# Patient Record
Sex: MALE | Race: WHITE | NOT HISPANIC OR LATINO | Employment: FULL TIME | RURAL
[De-identification: names, ages, dates, MRNs, and addresses within clinical notes are randomized per-mention and may not be internally consistent; named-entity substitution may affect disease eponyms.]

---

## 2019-03-27 ENCOUNTER — OFFICE VISIT (OUTPATIENT)
Dept: CARDIAC SURGERY | Facility: CLINIC | Age: 59
End: 2019-03-27

## 2019-03-27 VITALS
SYSTOLIC BLOOD PRESSURE: 145 MMHG | HEART RATE: 74 BPM | WEIGHT: 173 LBS | HEIGHT: 68 IN | DIASTOLIC BLOOD PRESSURE: 84 MMHG | BODY MASS INDEX: 26.22 KG/M2

## 2019-03-27 DIAGNOSIS — I73.9 PVD (PERIPHERAL VASCULAR DISEASE) (HCC): Primary | ICD-10-CM

## 2019-03-27 PROCEDURE — 99406 BEHAV CHNG SMOKING 3-10 MIN: CPT | Performed by: THORACIC SURGERY (CARDIOTHORACIC VASCULAR SURGERY)

## 2019-03-27 PROCEDURE — 99204 OFFICE O/P NEW MOD 45 MIN: CPT | Performed by: THORACIC SURGERY (CARDIOTHORACIC VASCULAR SURGERY)

## 2019-03-27 RX ORDER — LANOLIN ALCOHOL/MO/W.PET/CERES
1000 CREAM (GRAM) TOPICAL DAILY
COMMUNITY

## 2019-03-27 RX ORDER — ASPIRIN 81 MG/1
81 TABLET ORAL DAILY
COMMUNITY

## 2019-03-27 RX ORDER — OMEGA-3 FATTY ACIDS/FISH OIL 300-1000MG
1000 CAPSULE ORAL DAILY
COMMUNITY

## 2019-03-27 RX ORDER — CILOSTAZOL 100 MG/1
100 TABLET ORAL 2 TIMES DAILY
COMMUNITY

## 2019-03-27 RX ORDER — MELATONIN
1000 DAILY
COMMUNITY

## 2019-03-27 NOTE — PROGRESS NOTES
03/27/2019  Patient Information  Sameer TORRES  [facility] [address]   1960  'PCP/Referring Physician'  Cesar Shay MD  [phone]  Cesar Shay MD  [phone]  Chief Complaint   Patient presents with   • Consult     Np referred for PVD, complains of left leg pain.   • Peripheral Vascular Disease   • Leg Pain       History of Present Illness:   The patient is a 58-year-old male who is referred at this time for vascular disease.  He has been having leg pain for several years.  It appears to have progressed in nature however.  He is a smoker and has smoked approximately a pack per day for most of his adult life.  He continues to smoke.  He denies any specific trauma to this leg.  He has been referred for evaluation of his PV arterial study which is very abnormal with evidence of significant vascular disease in his left leg.  His ankle-brachial index of his left leg is 0.57.  He has normal ankle-brachial index on his right of 1.04.      Patient Active Problem List   Diagnosis   • PVD (peripheral vascular disease) (CMS/HCC)     Past Medical History:   Diagnosis Date   • Coronary artery disease    • Kidney stone    • Peripheral artery disease (CMS/HCC)      Past Surgical History:   Procedure Laterality Date   • CORONARY ARTERY BYPASS GRAFT         Current Outpatient Medications:   •  aspirin 81 MG EC tablet, Take 81 mg by mouth Daily., Disp: , Rfl:   •  cholecalciferol (VITAMIN D3) 1000 units tablet, Take 1,000 Units by mouth Daily., Disp: , Rfl:   •  cilostazol (PLETAL) 100 MG tablet, Take 100 mg by mouth 2 (Two) Times a Day., Disp: , Rfl:   •  Omega 3 1000 MG capsule, Take 1,000 mg by mouth Daily., Disp: , Rfl:   •  vitamin B-12 (CYANOCOBALAMIN) 1000 MCG tablet, Take 1,000 mcg by mouth Daily., Disp: , Rfl:   Allergies   Allergen Reactions   • Contrast Dye Other (See Comments)     Severe  "headache     Social History     Socioeconomic History   • Marital status: Single     Spouse name: Not on file   • Number of children: 1   • Years of education: Not on file   • Highest education level: Not on file   Occupational History   • Occupation: maintenance in factory   Tobacco Use   • Smoking status: Current Every Day Smoker     Packs/day: 1.00     Years: 40.00     Pack years: 40.00     Types: Cigarettes   • Smokeless tobacco: Never Used   • Tobacco comment: smokes 1/2 to 1 ppd   Substance and Sexual Activity   • Alcohol use: No     Frequency: Never   • Drug use: Yes     Types: Marijuana   Social History Narrative    [facility]     Family History   Problem Relation Age of Onset   • Lung cancer Mother    • Other Father         never knew his father     Review of Systems   Constitution: Negative for chills, fever, malaise/fatigue, night sweats and weight loss.   HENT: Negative for hearing loss, odynophagia and sore throat.    Cardiovascular: Positive for leg swelling. Negative for chest pain, dyspnea on exertion, orthopnea and palpitations.   Respiratory: Negative for cough and hemoptysis.    Endocrine: Negative for cold intolerance, heat intolerance, polydipsia, polyphagia and polyuria.   Hematologic/Lymphatic: Does not bruise/bleed easily.   Skin: Positive for itching. Negative for rash.   Musculoskeletal: Positive for joint pain and muscle cramps. Negative for joint swelling and myalgias.   Gastrointestinal: Negative for abdominal pain, constipation, diarrhea, hematemesis, hematochezia, melena, nausea and vomiting.   Genitourinary: Negative for dysuria, frequency and hematuria.   Neurological: Negative for focal weakness, headaches, numbness and seizures.   Psychiatric/Behavioral: Negative for suicidal ideas.   All other systems reviewed and are negative.    Vitals:    03/27/19 1624   BP: 145/84   BP Location: Left arm   Pulse: 74   Weight: 78.5 kg (173 lb)   Height: 172.7 cm (68\")    "   Physical Exam   Constitutional: He is oriented to person, place, and time. He appears well-developed and well-nourished. No distress.   HENT:   Head: Normocephalic.   Eyes: EOM are normal. Pupils are equal, round, and reactive to light.   Neck: Normal range of motion. Carotid bruit is not present. No thyromegaly present.   Cardiovascular: Normal rate and regular rhythm. Exam reveals no gallop and no friction rub.   No murmur heard.  Pulses:       Dorsalis pedis pulses are 1+ on the right side, and 0 on the left side.        Posterior tibial pulses are 1+ on the right side, and 0 on the left side.   Pulmonary/Chest: He has no wheezes. He has no rales.   Abdominal: Soft. Bowel sounds are normal. He exhibits no distension and no mass. There is no hepatomegaly. There is no tenderness.   Musculoskeletal: Normal range of motion. He exhibits no deformity.   Neurological: He is alert and oriented to person, place, and time. He has normal strength. No cranial nerve deficit or sensory deficit.   Skin: No bruising and no petechiae noted. No cyanosis. Nails show no clubbing.   Psychiatric: He has a normal mood and affect.     Labs/Imaging:  I obtained and reviewed medical records from Dr. Shay including the PV arterial study demonstrating an AMI on the left of 0.57.    Assessment/Plan:   This is a 58-year-old white male who has been referred for left leg pain.  I have obtained and reviewed his PV arterial study.  I concur with the interpretation that there is significant vascular blockage in the left leg.  The ankle-brachial index on the left is 0.57.  I discussed with the patient, as well as his wife, and given him the option of continued medical management with aspirin and Pletal versus angiogram with possible catheter-based intervention.  At this point, I also emphasized the importance of smoking cessation and spent 3-5 minutes on this.  He appears to understand all the options and elected to proceed with continued  conservative measures.  I will see him back in approximately 4 months for follow-up to see how he is doing.  He will contact me in the interim if he changes his mind.  Patient Active Problem List   Diagnosis   • PVD (peripheral vascular disease) (CMS/HCC)     CC: MD Emi Tadeo, , editing for Rashid Coyle M.D.    I, Rashid Coyle MD, have read and agree with the editing done by Emi Conde, .

## 2019-07-30 ENCOUNTER — TELEPHONE (OUTPATIENT)
Dept: CARDIAC SURGERY | Facility: CLINIC | Age: 59
End: 2019-07-30